# Patient Record
Sex: MALE | Race: WHITE
[De-identification: names, ages, dates, MRNs, and addresses within clinical notes are randomized per-mention and may not be internally consistent; named-entity substitution may affect disease eponyms.]

---
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
## 2021-05-23 ENCOUNTER — APPOINTMENT (OUTPATIENT)
Dept: PEDIATRICS | Facility: CLINIC | Age: 21
End: 2021-05-23
Payer: COMMERCIAL

## 2021-05-23 VITALS — TEMPERATURE: 97.6 F

## 2021-05-23 DIAGNOSIS — Z23 ENCOUNTER FOR IMMUNIZATION: ICD-10-CM

## 2021-05-23 PROCEDURE — 90471 IMMUNIZATION ADMIN: CPT

## 2021-05-23 PROCEDURE — 99072 ADDL SUPL MATRL&STAF TM PHE: CPT

## 2021-05-23 PROCEDURE — 90651 9VHPV VACCINE 2/3 DOSE IM: CPT

## 2021-07-30 ENCOUNTER — APPOINTMENT (OUTPATIENT)
Dept: PEDIATRICS | Facility: CLINIC | Age: 21
End: 2021-07-30
Payer: COMMERCIAL

## 2021-07-30 VITALS — HEART RATE: 111 BPM | OXYGEN SATURATION: 98 % | TEMPERATURE: 97 F | WEIGHT: 184.8 LBS

## 2021-07-30 DIAGNOSIS — J02.9 ACUTE PHARYNGITIS, UNSPECIFIED: ICD-10-CM

## 2021-07-30 DIAGNOSIS — R05 COUGH: ICD-10-CM

## 2021-07-30 LAB — S PYO AG SPEC QL IA: NEGATIVE

## 2021-07-30 PROCEDURE — 87880 STREP A ASSAY W/OPTIC: CPT | Mod: QW

## 2021-07-30 PROCEDURE — 99213 OFFICE O/P EST LOW 20 MIN: CPT | Mod: 25

## 2021-07-30 NOTE — DISCUSSION/SUMMARY
[FreeTextEntry1] : Discussed with family that current strep testing is NEGATIVE . A regular throat culture will be sent to the lab for further testing, with results obtained in 24-48 hours. If the throat culture is positive, a prescription will be sent to the designated  pharmacy. If the throat culture is negative after 48 hours and the patient is not better, the child should be rechecked. Discussed with family the etiology, natural course and treatment options for sore throat-pharyngitis. Recommended OTC therapy with pain/fever control products, topical products (lozenges, sprays, gargles) as needed per manufacturers recommendation. \par If throat culture is positive give- Amoxicillin\par \par \par

## 2021-07-30 NOTE — HISTORY OF PRESENT ILLNESS
[de-identified] : pt states on Wednesday he tested negative rapid Covid-19 and is currently awaiting PCR results, pt is saying that he has had a cough all week that has gotten worse in the morning and at night time, some congestion, no fever but states occasionally his head feels hot.